# Patient Record
Sex: FEMALE | Race: WHITE | Employment: FULL TIME | URBAN - METROPOLITAN AREA
[De-identification: names, ages, dates, MRNs, and addresses within clinical notes are randomized per-mention and may not be internally consistent; named-entity substitution may affect disease eponyms.]

---

## 2017-12-18 DIAGNOSIS — J01.90 ACUTE SINUSITIS WITH SYMPTOMS > 10 DAYS: Primary | ICD-10-CM

## 2017-12-18 RX ORDER — CLARITHROMYCIN 500 MG
500 TABLET ORAL 2 TIMES DAILY
Qty: 20 TABLET | Refills: 0 | Status: SHIPPED | OUTPATIENT
Start: 2017-12-18 | End: 2018-10-29

## 2018-01-11 ENCOUNTER — OFFICE VISIT (OUTPATIENT)
Dept: OTOLARYNGOLOGY | Facility: CLINIC | Age: 43
End: 2018-01-11
Payer: COMMERCIAL

## 2018-01-11 VITALS — WEIGHT: 150 LBS | TEMPERATURE: 98.1 F | BODY MASS INDEX: 24.11 KG/M2 | HEIGHT: 66 IN

## 2018-01-11 DIAGNOSIS — J32.9 CHRONIC SINUSITIS, UNSPECIFIED LOCATION: Primary | ICD-10-CM

## 2018-01-11 PROCEDURE — 31231 NASAL ENDOSCOPY DX: CPT | Performed by: OTOLARYNGOLOGY

## 2018-01-11 PROCEDURE — 99203 OFFICE O/P NEW LOW 30 MIN: CPT | Mod: 25 | Performed by: OTOLARYNGOLOGY

## 2018-01-11 RX ORDER — METHYLPREDNISOLONE 4 MG
TABLET, DOSE PACK ORAL
Qty: 21 TABLET | Refills: 0 | Status: SHIPPED | OUTPATIENT
Start: 2018-01-11 | End: 2019-03-26

## 2018-01-11 RX ORDER — FEXOFENADINE HCL AND PSEUDOEPHEDRINE HCI 60; 120 MG/1; MG/1
1 TABLET, EXTENDED RELEASE ORAL
COMMUNITY
Start: 2015-08-03

## 2018-01-11 RX ORDER — VALACYCLOVIR HYDROCHLORIDE 500 MG/1
500 TABLET, FILM COATED ORAL
COMMUNITY
Start: 2018-01-10

## 2018-01-11 RX ORDER — ALPRAZOLAM 1 MG
TABLET ORAL
COMMUNITY
Start: 2017-10-25

## 2018-01-11 RX ORDER — CLARITHROMYCIN 500 MG
500 TABLET ORAL 2 TIMES DAILY
Qty: 40 TABLET | Refills: 0 | Status: SHIPPED | OUTPATIENT
Start: 2018-01-11

## 2018-01-11 NOTE — MR AVS SNAPSHOT
After Visit Summary   1/11/2018    Carolyn Ramírez    MRN: [id number]           Patient Information     Date Of Birth          1975        Visit Information        Provider Department      1/11/2018 10:45 AM Perry Martin MD [facility]        Today's Diagnoses     Chronic sinusitis, unspecified location    -  1      Care Instructions    General Scheduling Information  To schedule your CT/MRI scan, please contact [facility] at [phone] OR [facility] at [phone]    To schedule your Surgery, please contact our Specialty Schedulers at [phone]      ENT Clinic Locations Clinic Hours Telephone Number     [facility]  [address] [facility]  [place] [address]     2nd & 4th Thursday:           8:00am - 12:00pm   To schedule/reschedule an appointment with   Dr. Martin,   please contact our   Specialty Scheduling Department at:     [phone]       [facility]  [address] [place] [address] [place] [address]   Monday:             8:00am -- 4:30 pm      1st, 3rd & 5th Thursday:           8:00am - 12:00pm      [address] [place] [address]   Wednesday:       9:00 -- 4:30 pm                    Follow-ups after your visit        Future tests that were ordered for you today     Open Future Orders        Priority Expected Expires Ordered    CT Maxillofacial w/o Contrast Routine  1/11/2019 1/11/2018            Who to contact     If you have questions or need follow up information about today's clinic visit or your schedule please contact [facility] directly at [phone].  Normal or non-critical lab and imaging results will be communicated to you by MyChart, letter or phone within 4 business days after the clinic has received the results. If you do not hear from us within 7 days, please contact the clinic through MyChart or phone. If you have a critical or abnormal lab result, we will notify you by phone as soon as  "possible.  Submit refill requests through [employer] or call your pharmacy and they will forward the refill request to us. Please allow 3 business days for your refill to be completed.          Additional Information About Your Visit        [employer] Information     [employer] lets you send messages to your doctor, view your test results, renew your prescriptions, schedule appointments and more. To sign up, go to [email or web address] . Click on \"Log in\" on the left side of the screen, which will take you to the Welcome page. Then click on \"Sign up Now\" on the right side of the page.     You will be asked to enter the access code listed below, as well as some personal information. Please follow the directions to create your username and password.     Your access code is: [email or web address]  Expires: 2018 12:28 PM     Your access code will  in 90 days. If you need help or a new code, please call your [place] clinic or [phone].        Care EveryWhere ID     This is your Care EveryWhere ID. This could be used by other organizations to access your [place] medical records  [id number]        Your Vitals Were     Temperature Height BMI (Body Mass Index)             98.1  F (36.7  C) (Tympanic) 1.676 m (5' 6\") 24.21 kg/m2          Blood Pressure from Last 3 Encounters:   No data found for BP    Weight from Last 3 Encounters:   18 68 kg (150 lb)              We Performed the Following     Nasal Endoscopy, Diag          Today's Medication Changes          These changes are accurate as of: 18 12:28 PM.  If you have any questions, ask your nurse or doctor.               Start taking these medicines.        Dose/Directions    methylPREDNISolone 4 MG tablet   Commonly known as:  MEDROL DOSEPAK   Used for:  Chronic sinusitis, unspecified location   Started by:  Perry Martin MD        Follow package instructions   Quantity:  21 tablet   Refills:  0         These medicines have changed or have updated " prescriptions.        Dose/Directions    * clarithromycin 500 MG tablet   Commonly known as:  BIAXIN   This may have changed:  Another medication with the same name was added. Make sure you understand how and when to take each.   Used for:  Acute sinusitis with symptoms > 10 days   Changed by:  Perry Martin MD        Dose:  500 mg   Take 1 tablet (500 mg) by mouth 2 times daily   Quantity:  20 tablet   Refills:  0       * clarithromycin 500 MG tablet   Commonly known as:  BIAXIN   This may have changed:  You were already taking a medication with the same name, and this prescription was added. Make sure you understand how and when to take each.   Used for:  Chronic sinusitis, unspecified location   Changed by:  Perry Martin MD        Dose:  500 mg   Take 1 tablet (500 mg) by mouth 2 times daily   Quantity:  40 tablet   Refills:  0       * Notice:  This list has 2 medication(s) that are the same as other medications prescribed for you. Read the directions carefully, and ask your doctor or other care provider to review them with you.         Where to get your medicines      These medications were sent to [facility] [address] [place] [address] [place], [place] - [address] [facility]  [address] [facility] [address]     Phone:  [phone]     clarithromycin 500 MG tablet    methylPREDNISolone 4 MG tablet                Primary Care Provider Office Phone # Fax #    Yury Wetzel [phone]       [facility] [address] [facility] [address]        Equal Access to Services     LEWIS BOLIVAR AH: Hadii shruthi anders hadasho Soomaali, waaxda luqadaha, qaybta kaalmada adeegyada, kelsey patterson . So [facility] [phone].    ATENCIÓN: Si habla español, tiene a myers disposición servicios gratuitos de asistencia lingüística. Llame al [phone].    We comply with applicable federal civil rights laws and [place] laws. We do not discriminate on the  basis of race, color, national origin, age, disability, sex, sexual orientation, or gender identity.            Thank you!     Thank you for choosing [facility]  for your care. Our goal is always to provide you with excellent care. Hearing back from our patients is one way we can continue to improve our services. Please take a few minutes to complete the written survey that you may receive in the mail after your visit with us. Thank you!             Your Updated Medication List - Protect others around you: Learn how to safely use, store and throw away your medicines at www.disposemymeds.org.          This list is accurate as of: 1/11/18 12:28 PM.  Always use your most recent med list.                   Brand Name Dispense Instructions for use Diagnosis    ALPRAZolam 1 MG tablet    XANAX          * clarithromycin 500 MG tablet    BIAXIN    20 tablet    Take 1 tablet (500 mg) by mouth 2 times daily    Acute sinusitis with symptoms > 10 days       * clarithromycin 500 MG tablet    BIAXIN    40 tablet    Take 1 tablet (500 mg) by mouth 2 times daily    Chronic sinusitis, unspecified location       fexofenadine-pseudoePHEDrine  MG per 12 hr tablet    ALLEGRA-D     Take 1 tablet by mouth        methylPREDNISolone 4 MG tablet    MEDROL DOSEPAK    21 tablet    Follow package instructions    Chronic sinusitis, unspecified location       valACYclovir 500 MG tablet    VALTREX     Take 500 mg by mouth        * Notice:  This list has 2 medication(s) that are the same as other medications prescribed for you. Read the directions carefully, and ask your doctor or other care provider to review them with you.

## 2018-01-11 NOTE — PATIENT INSTRUCTIONS
General Scheduling Information  To schedule your CT/MRI scan, please contact [facility] at [phone] OR [facility] at [phone]    To schedule your Surgery, please contact our Specialty Schedulers at [phone]      ENT Clinic Locations Clinic Hours Telephone Number     [facility]  [address] [facility]  [place] [address]     2nd & 4th Thursday:           8:00am - 12:00pm   To schedule/reschedule an appointment with   Dr. Martin,   please contact our   Specialty Scheduling Department at:     [phone]       [facility]  [address] [place] [address] [place] [address]   Monday:             8:00am -- 4:30 pm      1st, 3rd & 5th Thursday:           8:00am - 12:00pm      [facility] [address] [address]   Wednesday:       9:00 -- 4:30 pm

## 2018-01-11 NOTE — LETTER
1/11/2018         RE: Carolyn Ramírez  [address] [place] [address]        Dear Colleague,    Thank you for referring your patient, Carolyn Ramírez, to the [facility]. Please see a copy of my visit note below.    ENT Consultation    Carolyn Ramírez is a 42 year old female who is seen in consultation at the request of self.      History of Present Illness - Carolyn Ramírez is a 42 year old female with chronic sinus issues. She has had symptoms since the end of November. Currently, sense of smell is reduced. She took two Z-Packs with no benefit, as well as nasal spray which was not helpful.  She is allergic to many medications, has tried Augmentin with no benefit.      Past Medical History - No past medical history on file.    Current Medications -   Current Outpatient Prescriptions:      ALPRAZolam (XANAX) 1 MG tablet, , Disp: , Rfl:      fexofenadine-pseudoePHEDrine (ALLEGRA-D)  MG per 12 hr tablet, Take 1 tablet by mouth, Disp: , Rfl:      valACYclovir (VALTREX) 500 MG tablet, Take 500 mg by mouth, Disp: , Rfl:      clarithromycin (BIAXIN) 500 MG tablet, Take 1 tablet (500 mg) by mouth 2 times daily, Disp: 20 tablet, Rfl: 0    Allergies -   Allergies   Allergen Reactions     Cephalosporins Shortness Of Breath and Unknown     Sumatriptan Shortness Of Breath     Codeine Nausea and Vomiting     Levofloxacin Muscle Pain (Myalgia)     Meperidine Unknown       Social History -   Social History     Social History     Marital status: Single     Spouse name: N/A     Number of children: N/A     Years of education: N/A     Social History Main Topics     Smoking status: Never Smoker     Smokeless tobacco: Never Used     Alcohol use None     Drug use: No     Sexual activity: Not Asked     Other Topics Concern     None     Social History Narrative     None       Family History - No family history on file.    Review of Systems - As per HPI and PMHx, otherwise review of system  "review of the head and neck negative.    This document serves as a record of the services and decisions personally performed and made by Perry Martin MD. It was created on his behalf by Yaron Cedeno, a trained medical scribe. The creation of this document is based the provider's statements to the medical scribe.  Yaron Cedeno January 11, 2018 11:14 AM     Physical Exam  Temp 98.1  F (36.7  C) (Tympanic)  Ht 1.676 m (5' 6\")  Wt 68 kg (150 lb)  BMI 24.21 kg/m2  BMI: Body mass index is 24.21 kg/(m^2).    General - The patient is well nourished and well developed, and appears to have good nutritional status.  Alert and oriented to person and place, answers questions and cooperates with examination appropriately.    SKIN - No suspicious lesions or rashes.  Respiration - No respiratory distress.  Head and Face - Normocephalic and atraumatic, with no gross asymmetry noted of the contour of the facial features.  The facial nerve is intact, with strong symmetric movements.    Voice and Breathing - The patient was breathing comfortably without the use of accessory muscles. There was no wheezing, stridor, or stertor.  The patients voice was clear and strong, and had appropriate pitch and quality.    Ears - Bilateral pinna and EACs with normal appearing overlying skin. Tympanic membrane intact with good mobility on pneumatic otoscopy bilaterally. Bony landmarks of the ossicular chain are normal. The tympanic membranes are normal in appearance. No retraction, perforation, or masses.  No fluid or purulence was seen in the external canal or the middle ear. Clear    Eyes - Extraocular movements intact.  Sclera were not icteric or injected, conjunctiva were pink and moist.    Mouth - Examination of the oral cavity showed pink, healthy oral mucosa. No lesions or ulcerations noted.  The tongue was mobile and midline, and the dentition were in good condition.      Throat - The walls of the oropharynx were smooth, pink, " moist, symmetric, and had no lesions or ulcerations.  The tonsillar pillars and soft palate were symmetric.  The uvula was midline on elevation. No drainage noted.     Neck - Normal midline excursion of the laryngotracheal complex during swallowing.  Full range of motion on passive movement.  Palpation of the occipital, submental, submandibular, internal jugular chain, and supraclavicular nodes did not demonstrate any abnormal lymph nodes or masses.  The carotid pulse was palpable bilaterally.  Palpation of the thyroid was soft and smooth, with no nodules or goiter appreciated.  The trachea was mobile and midline.    Nose - External contour is symmetric, no gross deflection or scars.  Nasal mucosa is pink and moist with no abnormal mucus.  The septum was midline and non-obstructive, turbinates of normal size and position.  No polyps, masses, or purulence noted on examination. Deviated septum to the left with enlarged turbinates.     Neuro - Nonfocal neuro exam is normal, CN 2 through 12 intact, normal gait and muscle tone.    Performed in clinic today:    Procedure: Nasal Endoscopy   Indication: Chronic Sinusitis    To further evaluate the nasal cavity, I performed rigid nasal endoscopy.  I first sprayed the nasal cavity bilaterally with a mix of lidocaine and neosynephrine.  I then began on the left side using a 2.7mm, 30 degree rigid nasal endoscope.  The septum was deviated and the nasal airway was obstructed.  No abnormal secretions, purulence, or polyps were noted. The left middle turbinate and middle meatus were clearly visualized and normal in appearance.  Looking up, the olfactory cleft was unobstructed.  Going further back, the sphenoethmoid recess was normal in appearance, with healthy appearing mucosa on the face of the sphenoid.  The nasopharynx was unremarkable, and the eustachian tube opening on this side was unobstructed.  Turbinates are inflamed and have a boggy appearance.     I then turned my  attention to the right side.  Once again, the septum was deviated, and the airway was open.  No abnormal secretions, purulence, polyps were noted.  The right middle turbinate and middle meatus were clearly visualized and normal in appearance.  Looking up, the olfactory cleft was unobstructed.  Going further back the right sphenoethmoid recess was normal in appearance, and eustachian tube opening was unobstructed. No polyps, but inflammation of the Turbinates.    Red - [id number] Mytamed     A/P - Carolyn Ramírez is a 42 year old female with deviated septum and chronic sinusitis      CT of the sinuses   Medrol Dosepak   Biaxin   Follow up after CT     Perry Martin MD    The information in this document, created by the medical scribe for me, accurately reflects the services I personally performed and the decisions made by me. I have reviewed and approved this document for accuracy prior to leaving the patient care area.  Perry Martin MD  11:13 AM, 01/11/18      Again, thank you for allowing me to participate in the care of your patient.        Sincerely,        Perry Martin MD, MD

## 2018-01-11 NOTE — PROGRESS NOTES
ENT Consultation    Carolyn Ramírez is a 42 year old female who is seen in consultation at the request of self.      History of Present Illness - Carolyn Ramírez is a 42 year old female with chronic sinus issues. She has had symptoms since the end of November. Currently, sense of smell is reduced. She took two Z-Packs with no benefit, as well as nasal spray which was not helpful.  She is allergic to many medications, has tried Augmentin with no benefit.      Past Medical History - No past medical history on file.    Current Medications -   Current Outpatient Prescriptions:      ALPRAZolam (XANAX) 1 MG tablet, , Disp: , Rfl:      fexofenadine-pseudoePHEDrine (ALLEGRA-D)  MG per 12 hr tablet, Take 1 tablet by mouth, Disp: , Rfl:      valACYclovir (VALTREX) 500 MG tablet, Take 500 mg by mouth, Disp: , Rfl:      clarithromycin (BIAXIN) 500 MG tablet, Take 1 tablet (500 mg) by mouth 2 times daily, Disp: 20 tablet, Rfl: 0    Allergies -   Allergies   Allergen Reactions     Cephalosporins Shortness Of Breath and Unknown     Sumatriptan Shortness Of Breath     Codeine Nausea and Vomiting     Levofloxacin Muscle Pain (Myalgia)     Meperidine Unknown       Social History -   Social History     Social History     Marital status: Single     Spouse name: N/A     Number of children: N/A     Years of education: N/A     Social History Main Topics     Smoking status: Never Smoker     Smokeless tobacco: Never Used     Alcohol use None     Drug use: No     Sexual activity: Not Asked     Other Topics Concern     None     Social History Narrative     None       Family History - No family history on file.    Review of Systems - As per HPI and PMHx, otherwise review of system review of the head and neck negative.    This document serves as a record of the services and decisions personally performed and made by Perry Martin MD. It was created on his behalf by Yaron Cedeno, a trained medical scribe. The creation of this  "document is based the provider's statements to the medical scribe.  Yaron Cedeno January 11, 2018 11:14 AM     Physical Exam  Temp 98.1  F (36.7  C) (Tympanic)  Ht 1.676 m (5' 6\")  Wt 68 kg (150 lb)  BMI 24.21 kg/m2  BMI: Body mass index is 24.21 kg/(m^2).    General - The patient is well nourished and well developed, and appears to have good nutritional status.  Alert and oriented to person and place, answers questions and cooperates with examination appropriately.    SKIN - No suspicious lesions or rashes.  Respiration - No respiratory distress.  Head and Face - Normocephalic and atraumatic, with no gross asymmetry noted of the contour of the facial features.  The facial nerve is intact, with strong symmetric movements.    Voice and Breathing - The patient was breathing comfortably without the use of accessory muscles. There was no wheezing, stridor, or stertor.  The patients voice was clear and strong, and had appropriate pitch and quality.    Ears - Bilateral pinna and EACs with normal appearing overlying skin. Tympanic membrane intact with good mobility on pneumatic otoscopy bilaterally. Bony landmarks of the ossicular chain are normal. The tympanic membranes are normal in appearance. No retraction, perforation, or masses.  No fluid or purulence was seen in the external canal or the middle ear. Clear    Eyes - Extraocular movements intact.  Sclera were not icteric or injected, conjunctiva were pink and moist.    Mouth - Examination of the oral cavity showed pink, healthy oral mucosa. No lesions or ulcerations noted.  The tongue was mobile and midline, and the dentition were in good condition.      Throat - The walls of the oropharynx were smooth, pink, moist, symmetric, and had no lesions or ulcerations.  The tonsillar pillars and soft palate were symmetric.  The uvula was midline on elevation. No drainage noted.     Neck - Normal midline excursion of the laryngotracheal complex during swallowing.  Full " range of motion on passive movement.  Palpation of the occipital, submental, submandibular, internal jugular chain, and supraclavicular nodes did not demonstrate any abnormal lymph nodes or masses.  The carotid pulse was palpable bilaterally.  Palpation of the thyroid was soft and smooth, with no nodules or goiter appreciated.  The trachea was mobile and midline.    Nose - External contour is symmetric, no gross deflection or scars.  Nasal mucosa is pink and moist with no abnormal mucus.  The septum was midline and non-obstructive, turbinates of normal size and position.  No polyps, masses, or purulence noted on examination. Deviated septum to the left with enlarged turbinates.     Neuro - Nonfocal neuro exam is normal, CN 2 through 12 intact, normal gait and muscle tone.    Performed in clinic today:    Procedure: Nasal Endoscopy   Indication: Chronic Sinusitis    To further evaluate the nasal cavity, I performed rigid nasal endoscopy.  I first sprayed the nasal cavity bilaterally with a mix of lidocaine and neosynephrine.  I then began on the left side using a 2.7mm, 30 degree rigid nasal endoscope.  The septum was deviated and the nasal airway was obstructed.  No abnormal secretions, purulence, or polyps were noted. The left middle turbinate and middle meatus were clearly visualized and normal in appearance.  Looking up, the olfactory cleft was unobstructed.  Going further back, the sphenoethmoid recess was normal in appearance, with healthy appearing mucosa on the face of the sphenoid.  The nasopharynx was unremarkable, and the eustachian tube opening on this side was unobstructed.  Turbinates are inflamed and have a boggy appearance.     I then turned my attention to the right side.  Once again, the septum was deviated, and the airway was open.  No abnormal secretions, purulence, polyps were noted.  The right middle turbinate and middle meatus were clearly visualized and normal in appearance.  Looking up, the  olfactory cleft was unobstructed.  Going further back the right sphenoethmoid recess was normal in appearance, and eustachian tube opening was unobstructed. No polyps, but inflammation of the Turbinates.    Red - [id number] Mytamed     A/P - Carolyn Ramírez is a 42 year old female with deviated septum and chronic sinusitis      CT of the sinuses   Medrol Dosepak   Biaxin   Follow up after CT     Perry Martin MD    The information in this document, created by the medical scribe for me, accurately reflects the services I personally performed and the decisions made by me. I have reviewed and approved this document for accuracy prior to leaving the patient care area.  Perry Martin MD  11:13 AM, 01/11/18

## 2018-01-15 ENCOUNTER — HOSPITAL ENCOUNTER (OUTPATIENT)
Dept: CT IMAGING | Facility: CLINIC | Age: 43
Discharge: HOME | End: 2018-01-15
Attending: OTOLARYNGOLOGY | Admitting: OTOLARYNGOLOGY
Payer: COMMERCIAL

## 2018-01-15 DIAGNOSIS — J32.9 CHRONIC SINUSITIS, UNSPECIFIED LOCATION: ICD-10-CM

## 2018-01-15 PROCEDURE — 70486 CT MAXILLOFACIAL W/O DYE: CPT

## 2018-10-29 DIAGNOSIS — J01.90 ACUTE SINUSITIS WITH SYMPTOMS > 10 DAYS: ICD-10-CM

## 2018-10-29 RX ORDER — CLARITHROMYCIN 500 MG
500 TABLET ORAL 2 TIMES DAILY
Qty: 20 TABLET | Refills: 0 | Status: SHIPPED | OUTPATIENT
Start: 2018-10-29 | End: 2019-03-26

## 2018-10-29 NOTE — TELEPHONE ENCOUNTER
Pending Prescriptions:                       Disp   Refills    clarithromycin (BIAXIN) 500 MG tablet     20 tab*0            Sig: Take 1 tablet (500 mg) by mouth 2 times daily

## 2019-03-26 ENCOUNTER — HOSPITAL ENCOUNTER (EMERGENCY)
Facility: CLINIC | Age: 44
Discharge: HOME | End: 2019-03-26
Attending: PHYSICIAN ASSISTANT | Admitting: PHYSICIAN ASSISTANT
Payer: COMMERCIAL

## 2019-03-26 ENCOUNTER — TELEPHONE (OUTPATIENT)
Dept: CARDIOLOGY | Facility: CLINIC | Age: 44
End: 2019-03-26

## 2019-03-26 VITALS
HEART RATE: 83 BPM | BODY MASS INDEX: 21.69 KG/M2 | RESPIRATION RATE: 17 BRPM | HEIGHT: 66 IN | DIASTOLIC BLOOD PRESSURE: 76 MMHG | OXYGEN SATURATION: 100 % | WEIGHT: 135 LBS | TEMPERATURE: 98 F | SYSTOLIC BLOOD PRESSURE: 102 MMHG

## 2019-03-26 DIAGNOSIS — R79.89 LOW TSH LEVEL: ICD-10-CM

## 2019-03-26 DIAGNOSIS — R00.2 PALPITATIONS: ICD-10-CM

## 2019-03-26 LAB
ANION GAP SERPL CALCULATED.3IONS-SCNC: 7 MMOL/L (ref 3–14)
BASOPHILS # BLD AUTO: 0 10E9/L (ref 0–0.2)
BASOPHILS NFR BLD AUTO: 0.5 %
BUN SERPL-MCNC: 15 MG/DL (ref 7–30)
CALCIUM SERPL-MCNC: 8.7 MG/DL (ref 8.5–10.1)
CHLORIDE SERPL-SCNC: 108 MMOL/L (ref 94–109)
CO2 SERPL-SCNC: 26 MMOL/L (ref 20–32)
CREAT SERPL-MCNC: 0.9 MG/DL (ref 0.52–1.04)
DIFFERENTIAL METHOD BLD: NORMAL
EOSINOPHIL # BLD AUTO: 0.1 10E9/L (ref 0–0.7)
EOSINOPHIL NFR BLD AUTO: 1.7 %
ERYTHROCYTE [DISTWIDTH] IN BLOOD BY AUTOMATED COUNT: 13.2 % (ref 10–15)
GFR SERPL CREATININE-BSD FRML MDRD: 79 ML/MIN/{1.73_M2}
GLUCOSE SERPL-MCNC: 83 MG/DL (ref 70–99)
HCG SERPL QL: NEGATIVE
HCT VFR BLD AUTO: 42.3 % (ref 35–47)
HGB BLD-MCNC: 14.6 G/DL (ref 11.7–15.7)
IMM GRANULOCYTES # BLD: 0 10E9/L (ref 0–0.4)
IMM GRANULOCYTES NFR BLD: 0.5 %
LYMPHOCYTES # BLD AUTO: 3 10E9/L (ref 0.8–5.3)
LYMPHOCYTES NFR BLD AUTO: 39.7 %
MCH RBC QN AUTO: 31.8 PG (ref 26.5–33)
MCHC RBC AUTO-ENTMCNC: 34.5 G/DL (ref 31.5–36.5)
MCV RBC AUTO: 92 FL (ref 78–100)
MONOCYTES # BLD AUTO: 0.6 10E9/L (ref 0–1.3)
MONOCYTES NFR BLD AUTO: 8.4 %
NEUTROPHILS # BLD AUTO: 3.7 10E9/L (ref 1.6–8.3)
NEUTROPHILS NFR BLD AUTO: 49.2 %
NRBC # BLD AUTO: 0 10*3/UL
NRBC BLD AUTO-RTO: 0 /100
PLATELET # BLD AUTO: 228 10E9/L (ref 150–450)
POTASSIUM SERPL-SCNC: 3.4 MMOL/L (ref 3.4–5.3)
RBC # BLD AUTO: 4.59 10E12/L (ref 3.8–5.2)
SODIUM SERPL-SCNC: 141 MMOL/L (ref 133–144)
T4 FREE SERPL-MCNC: 1.13 NG/DL (ref 0.76–1.46)
TROPONIN I SERPL-MCNC: <0.015 UG/L (ref 0–0.04)
TSH SERPL DL<=0.005 MIU/L-ACNC: 0.03 MU/L (ref 0.4–4)
WBC # BLD AUTO: 7.5 10E9/L (ref 4–11)

## 2019-03-26 PROCEDURE — 84443 ASSAY THYROID STIM HORMONE: CPT | Performed by: PHYSICIAN ASSISTANT

## 2019-03-26 PROCEDURE — 84439 ASSAY OF FREE THYROXINE: CPT | Performed by: PHYSICIAN ASSISTANT

## 2019-03-26 PROCEDURE — 93005 ELECTROCARDIOGRAM TRACING: CPT

## 2019-03-26 PROCEDURE — 84484 ASSAY OF TROPONIN QUANT: CPT | Performed by: PHYSICIAN ASSISTANT

## 2019-03-26 PROCEDURE — 85025 COMPLETE CBC W/AUTO DIFF WBC: CPT | Performed by: PHYSICIAN ASSISTANT

## 2019-03-26 PROCEDURE — 80048 BASIC METABOLIC PNL TOTAL CA: CPT | Performed by: PHYSICIAN ASSISTANT

## 2019-03-26 PROCEDURE — 99284 EMERGENCY DEPT VISIT MOD MDM: CPT

## 2019-03-26 PROCEDURE — 84703 CHORIONIC GONADOTROPIN ASSAY: CPT | Performed by: PHYSICIAN ASSISTANT

## 2019-03-26 ASSESSMENT — ENCOUNTER SYMPTOMS
FATIGUE: 1
FEVER: 0
SHORTNESS OF BREATH: 1
NAUSEA: 0
NUMBNESS: 0
VOMITING: 0
ABDOMINAL PAIN: 0
COUGH: 0
CHILLS: 0
PALPITATIONS: 1

## 2019-03-26 ASSESSMENT — MIFFLIN-ST. JEOR: SCORE: 1294.11

## 2019-03-26 NOTE — ED AVS SNAPSHOT
Emergency Department  [address] [facility] [address]  Phone:  [phone]  Fax:  [phone]                                    Carolyn Ramírez   MRN: [id number]    Department:   Emergency Department   Date of Visit:  3/26/2019           After Visit Summary Signature Page    I have received my discharge instructions, and my questions have been answered. I have discussed any challenges I see with this plan with the nurse or doctor.    ..........................................................................................................................................  Patient/Patient Representative Signature      ..........................................................................................................................................  Patient Representative Print Name and Relationship to Patient    ..................................................               ................................................  Date                                   Time    ..........................................................................................................................................  Reviewed by Signature/Title    ...................................................              ..............................................  Date                                               Time          22EPIC Rev 08/18

## 2019-03-26 NOTE — ED PROVIDER NOTES
History     Chief Complaint:  Palpitations     HPI   Carolyn Ramírez is a 43 year old female who presents to the ED for evaluation of palpitations. The patient reports she has been having palpitations and tachycardia for the past month. She has associated shortness of breath and fatigue with walking, even short distances and up the stairs. She was seen at [facility] on 3/14/19 and had a Zio patch placed. She mailed in the readings last week. She is prompted to the ED due to worsening symptoms today. 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 The patient notes she is unable to make an appointment with cardiology until 2-3 months out. The patient denies any fever, chills, cough, nausea, vomiting, abdominal pain, numbness, or tingling.      XR Chest 2 Views, PA & lateral   IMPRESSION: Lungs are clear.  Vinny Ruiz MD     Laboratory, 3/14/2019  Pregnancy: Negative   D dimer : <0.27  Troponin I: Negative  CBC: o/w WNL (WBC 7.4, HGB 13.8, )  CMP: Carbon dioxide 20(L), o/w WNL (Creatinine 0.73)     Allergies:  Cephalosporins: shortness of breath    Sumatriptan: shortness of breath    Codeine: nausea & vomiting  Levofloxacin: myalgia  Meperidine     Medications:    Xanax  Levothyroxine     Past Medical History:    Hypothyroidism   Cervicalgia     Past Surgical History:    Appendectomy      Family History:    History reviewed. No pertinent family history.     Social History:  Smoking status: Never smoker    Alcohol use: Yes  Presents to ED alone   Marital Status:  Single [1]     Review of Systems   Constitutional: Positive for fatigue. Negative for chills and fever.   Respiratory: Positive for shortness of breath. Negative for cough.    Cardiovascular: Positive for palpitations.   Gastrointestinal: Negative for abdominal pain, nausea and vomiting.   Neurological: Negative for numbness.   All other systems reviewed and are  "negative.    Physical Exam     Patient Vitals for the past 24 hrs:   BP Temp Temp src Pulse Heart Rate Resp SpO2 Height Weight   03/26/19 1730 102/76 -- -- 83 -- -- -- -- --   03/26/19 1728 -- -- -- -- 83 17 -- -- --   03/26/19 1600 -- -- -- -- 82 18 100 % -- --   03/26/19 1502 106/66 98  F (36.7  C) Oral 85 -- 18 100 % 1.676 m (5' 6\") 61.2 kg (135 lb)     Physical Exam  General: Alert, interactive. GCS 15  Head:  Scalp is atraumatic.  Eyes:  EOM intact. The pupils are equal, round, and reactive to light. No scleral icterus.  ENT:                                      Ears:  The external ears are normal.  Nose:  The external nose is normal.  Throat:  The oropharynx is normal. Mucus membranes are moist.                 Neck:  Normal range of motion. There is no rigidity.   CV:  Regular rate and rhythm. No murmur. 2+ radial pulses  Resp:  Breath sounds are clear bilaterally. Non-labored, no retractions or accessory muscle use.  GI:  Abdomen is soft, no distension, no tenderness.   MS:  Normal range of motion.   Skin:  Warm and dry.   Neuro:  Strength and sensation grossly intact.   Psych:  Awake. Alert.  Appropriate interactions.     Emergency Department Course     ECG (15:01:56):  Rate 83 bpm. NV interval 128. QRS duration 82. QT/QTc 382/448. P-R-T axes 59 60 67. Normal sinus rhythm. Normal ECG. Interpreted at 1506 by Dr. Flannery and reviewed by Sonia Berumen PA-C.    Laboratory:  HCG pregnancy blood: Negative     TSH: 0.03(L)  T4 free: 1.13  Troponin I now (1558): <0.015    CBC: o/w WNL (WBC 7.5, HGB 14.6, )  BMP: o/w WNL (Creatinine 0.90)     Emergency Department Course:  Past medical records, nursing notes, and vitals reviewed.  1526: I performed an exam of the patient and obtained history, as documented above.    IV inserted and blood drawn.    1722: I rechecked the patient. Explained findings to patient.    Findings and plan explained to the Patient. Patient discharged home with instructions regarding " supportive care, medications, and reasons to return. The importance of close follow-up was reviewed.     Impression & Plan      Medical Decision Making:  Carolyn Ramírez is a 43 year old female with medical history including hypothyroidism on levothyroxine who presents for evaluation of palpitations.  Initial ECG shows sinus rhythm.   Vitals normal on arrival- HR between 75-85 throughout her stay. No irregular rhythms noted on cardiac monitoring. A broad differential diagnosis was considered including SVT, atrial fibrillation, ventricular arrhythmia, thyroid disease, acute electrolyte abnormality, drugs/medications, caffeine intake or other stimulants, medication side effect, anemia, heart disease, PE, etc.  No electrolyte abnormalities. Troponin negative. Doubt acute coronary syndrome given symptoms and exam. The patient recently wore a Holter monitor and awaiting these results.  She plans to follow-up with her primary care provider and to discuss these results once they return.  TSH returned low at 0.03, suggesting she needs to decrease her levothyroxine dose.  Suspect overdosing may be contributing to symptoms.  Patient notes that she recently changed from the generic to the brand name of levothyroxine.  She plans to call her endocrinologist tomorrow to discuss medication adjustments.    At this time, with reasonable clinical certainty, I feel she is safe for discharge home with close follow-up with her primary care provider.  She agrees with the plan and all questions and concerns addressed prior to discharge home.  Return precautions discussed and she voices understanding.    Diagnosis:    ICD-10-CM   1. Palpitations R00.2   2. Low TSH level R79.89     Disposition: Patient discharged to home     Pamela Saeed  3/26/2019    EMERGENCY DEPARTMENT    Scribe Disclosure:  Pamela LOWRY, am serving as a scribe at 3:26 PM on 3/26/2019 to document services personally performed by Sonia Berumen PA-C  based on my observations and the provider's statements to me.        Sonia Berumen PA-C  03/27/19 0956

## 2019-03-26 NOTE — TELEPHONE ENCOUNTER
Pt was transferred from scheduling to speak to triage nurse. Patient reports having SOB while sitting at her desk today, heart feeling like it is racing, has been taking her pulse, reporting last result of 101. States she has also been having some mild chest pain off and on. She states was seen previously in ER and did have Ziopatch monitor placed. She completed that and sent it in last week. Awaiting results. She had called to schedule Cardiology appt F/U and was reporting symptoms. Patient is advised with current symptoms she is reporting to go to ER now to be evaluated. She states she will do so.

## 2019-03-26 NOTE — DISCHARGE INSTRUCTIONS
Call endocrinologist tomorrow to discuss THS levels today.  Hold levothyroxine and to follow-up with endocrinologist.  Follow-up with primary care provider in 2 days for recheck.  Return to emergency department for worsening chest pain, shortness of breath, or any other new/concerning symptoms.    Discharge Instructions  Palpitations    Palpitations are an unusual awareness of your heartbeat. People often describe this as the heart skipping, fluttering, racing, irregular, or pounding. At this time, your provider has found no signs that your palpitations are due to a serious or life-threatening condition. However, sometimes there is a serious problem that does not show up right away.    Palpitations can be caused by caffeine, cigarettes, diet pills, energy drinks or supplements, other stimulants, and medications and street drugs. They can also be caused by anxiety, hormone conditions such as high thyroid, and other medical conditions. Sometimes they are a sign of abnormal rhythm in the heart. At this time, your provider did not find any dangerous cause of your symptoms.    Generally, every Emergency Department visit should have a follow-up clinic visit with either a primary or a specialty clinic/provider. Please follow-up as instructed by your emergency provider today.    Return to the Emergency Department if:  You get chest pain or tightness.  You are short of breath.  You get very weak or tired.  You pass out or faint.  Your heart rate is over 120 beats per minute for more than 10 minutes while you are resting.   You have anything else that worries you.    What can I do to help myself?  Fill any prescriptions the provider gave you and take them right away.   Follow your provider?s instructions about the prescription medicines you are on. Sometimes the provider may tell you to stop taking a medicine or change the dose.  If you smoke, this may be a good time to quit! The less you can smoke, the better.  Do not use  energy drinks, diet pills, or stimulants. Limit your use of caffeine.  If you were given a prescription for medicine here today, be sure to read all of the information (including the package insert) that comes with your prescription.  This will include important information about the medicine, its side effects, and any warnings that you need to know about.  The pharmacist who fills the prescription can provide more information and answer questions you may have about the medicine.  If you have questions or concerns that the pharmacist cannot address, please call or return to the Emergency Department.     Remember that you can always come back to the Emergency Department if you are not able to see your regular provider in the amount of time listed above, if you get any new symptoms, or if there is anything that worries you.

## 2025-02-14 ENCOUNTER — LAB REQUISITION (OUTPATIENT)
Dept: LAB | Facility: CLINIC | Age: 50
End: 2025-02-14

## 2025-02-14 DIAGNOSIS — N95.1 MENOPAUSAL AND FEMALE CLIMACTERIC STATES: ICD-10-CM

## 2025-02-14 PROCEDURE — 84403 ASSAY OF TOTAL TESTOSTERONE: CPT | Performed by: STUDENT IN AN ORGANIZED HEALTH CARE EDUCATION/TRAINING PROGRAM

## 2025-02-19 LAB — TESTOST SERPL-MCNC: 11 NG/DL (ref 8–60)
